# Patient Record
Sex: FEMALE | Race: BLACK OR AFRICAN AMERICAN | NOT HISPANIC OR LATINO
[De-identification: names, ages, dates, MRNs, and addresses within clinical notes are randomized per-mention and may not be internally consistent; named-entity substitution may affect disease eponyms.]

---

## 2018-08-02 PROBLEM — Z00.00 ENCOUNTER FOR PREVENTIVE HEALTH EXAMINATION: Status: ACTIVE | Noted: 2018-08-02

## 2019-09-03 ENCOUNTER — APPOINTMENT (OUTPATIENT)
Dept: ORTHOPEDIC SURGERY | Facility: CLINIC | Age: 79
End: 2019-09-03
Payer: MEDICARE

## 2019-09-03 VITALS
WEIGHT: 196 LBS | HEIGHT: 63 IN | DIASTOLIC BLOOD PRESSURE: 90 MMHG | SYSTOLIC BLOOD PRESSURE: 133 MMHG | BODY MASS INDEX: 34.73 KG/M2 | HEART RATE: 88 BPM

## 2019-09-03 DIAGNOSIS — Z87.39 PERSONAL HISTORY OF OTHER DISEASES OF THE MUSCULOSKELETAL SYSTEM AND CONNECTIVE TISSUE: ICD-10-CM

## 2019-09-03 DIAGNOSIS — Z86.39 PERSONAL HISTORY OF OTHER ENDOCRINE, NUTRITIONAL AND METABOLIC DISEASE: ICD-10-CM

## 2019-09-03 DIAGNOSIS — Z86.79 PERSONAL HISTORY OF OTHER DISEASES OF THE CIRCULATORY SYSTEM: ICD-10-CM

## 2019-09-03 DIAGNOSIS — M16.11 UNILATERAL PRIMARY OSTEOARTHRITIS, RIGHT HIP: ICD-10-CM

## 2019-09-03 DIAGNOSIS — Z82.61 FAMILY HISTORY OF ARTHRITIS: ICD-10-CM

## 2019-09-03 DIAGNOSIS — Z95.0 PRESENCE OF CARDIAC PACEMAKER: ICD-10-CM

## 2019-09-03 PROCEDURE — 99204 OFFICE O/P NEW MOD 45 MIN: CPT

## 2019-09-03 PROCEDURE — 73502 X-RAY EXAM HIP UNI 2-3 VIEWS: CPT

## 2019-09-03 RX ORDER — APIXABAN 5 MG/1
TABLET, FILM COATED ORAL
Refills: 0 | Status: ACTIVE | COMMUNITY

## 2019-09-03 RX ORDER — SOTALOL HYDROCHLORIDE 80 MG/1
80 TABLET ORAL
Refills: 0 | Status: ACTIVE | COMMUNITY

## 2019-09-03 RX ORDER — ALIROCUMAB 75 MG/ML
75 INJECTION, SOLUTION SUBCUTANEOUS
Refills: 0 | Status: ACTIVE | COMMUNITY

## 2019-09-17 ENCOUNTER — OUTPATIENT (OUTPATIENT)
Dept: OUTPATIENT SERVICES | Facility: HOSPITAL | Age: 79
LOS: 1 days | End: 2019-09-17
Payer: MEDICARE

## 2019-09-17 ENCOUNTER — TRANSCRIPTION ENCOUNTER (OUTPATIENT)
Age: 79
End: 2019-09-17

## 2019-09-17 VITALS
TEMPERATURE: 98 F | RESPIRATION RATE: 15 BRPM | SYSTOLIC BLOOD PRESSURE: 118 MMHG | HEART RATE: 93 BPM | DIASTOLIC BLOOD PRESSURE: 82 MMHG | HEIGHT: 63 IN | WEIGHT: 191.8 LBS | OXYGEN SATURATION: 100 %

## 2019-09-17 DIAGNOSIS — M25.551 PAIN IN RIGHT HIP: ICD-10-CM

## 2019-09-17 DIAGNOSIS — Z90.721 ACQUIRED ABSENCE OF OVARIES, UNILATERAL: Chronic | ICD-10-CM

## 2019-09-17 DIAGNOSIS — Z90.710 ACQUIRED ABSENCE OF BOTH CERVIX AND UTERUS: Chronic | ICD-10-CM

## 2019-09-17 DIAGNOSIS — M16.11 UNILATERAL PRIMARY OSTEOARTHRITIS, RIGHT HIP: ICD-10-CM

## 2019-09-17 DIAGNOSIS — Z96.659 PRESENCE OF UNSPECIFIED ARTIFICIAL KNEE JOINT: Chronic | ICD-10-CM

## 2019-09-17 DIAGNOSIS — Z90.89 ACQUIRED ABSENCE OF OTHER ORGANS: Chronic | ICD-10-CM

## 2019-09-17 DIAGNOSIS — Z79.01 LONG TERM (CURRENT) USE OF ANTICOAGULANTS: ICD-10-CM

## 2019-09-17 DIAGNOSIS — Z01.818 ENCOUNTER FOR OTHER PREPROCEDURAL EXAMINATION: ICD-10-CM

## 2019-09-17 DIAGNOSIS — Z95.0 PRESENCE OF CARDIAC PACEMAKER: ICD-10-CM

## 2019-09-17 LAB
ALBUMIN SERPL ELPH-MCNC: 3.7 G/DL — SIGNIFICANT CHANGE UP (ref 3.3–5)
ALP SERPL-CCNC: 104 U/L — SIGNIFICANT CHANGE UP (ref 30–120)
ALT FLD-CCNC: 34 U/L DA — SIGNIFICANT CHANGE UP (ref 10–60)
ANION GAP SERPL CALC-SCNC: 8 MMOL/L — SIGNIFICANT CHANGE UP (ref 5–17)
APTT BLD: 32.6 SEC — SIGNIFICANT CHANGE UP (ref 28.5–37)
AST SERPL-CCNC: 23 U/L — SIGNIFICANT CHANGE UP (ref 10–40)
BILIRUB SERPL-MCNC: 0.5 MG/DL — SIGNIFICANT CHANGE UP (ref 0.2–1.2)
BUN SERPL-MCNC: 28 MG/DL — HIGH (ref 7–23)
CALCIUM SERPL-MCNC: 9.7 MG/DL — SIGNIFICANT CHANGE UP (ref 8.4–10.5)
CHLORIDE SERPL-SCNC: 104 MMOL/L — SIGNIFICANT CHANGE UP (ref 96–108)
CO2 SERPL-SCNC: 29 MMOL/L — SIGNIFICANT CHANGE UP (ref 22–31)
CREAT SERPL-MCNC: 1.21 MG/DL — SIGNIFICANT CHANGE UP (ref 0.5–1.3)
GLUCOSE SERPL-MCNC: 90 MG/DL — SIGNIFICANT CHANGE UP (ref 70–99)
HCT VFR BLD CALC: 38.7 % — SIGNIFICANT CHANGE UP (ref 34.5–45)
HGB BLD-MCNC: 12.8 G/DL — SIGNIFICANT CHANGE UP (ref 11.5–15.5)
INR BLD: 1.46 RATIO — HIGH (ref 0.88–1.16)
MCHC RBC-ENTMCNC: 28.3 PG — SIGNIFICANT CHANGE UP (ref 27–34)
MCHC RBC-ENTMCNC: 33.1 GM/DL — SIGNIFICANT CHANGE UP (ref 32–36)
MCV RBC AUTO: 85.4 FL — SIGNIFICANT CHANGE UP (ref 80–100)
NRBC # BLD: 0 /100 WBCS — SIGNIFICANT CHANGE UP (ref 0–0)
PLATELET # BLD AUTO: 168 K/UL — SIGNIFICANT CHANGE UP (ref 150–400)
POTASSIUM SERPL-MCNC: 4.5 MMOL/L — SIGNIFICANT CHANGE UP (ref 3.5–5.3)
POTASSIUM SERPL-SCNC: 4.5 MMOL/L — SIGNIFICANT CHANGE UP (ref 3.5–5.3)
PROT SERPL-MCNC: 7.6 G/DL — SIGNIFICANT CHANGE UP (ref 6–8.3)
PROTHROM AB SERPL-ACNC: 16.1 SEC — HIGH (ref 10–12.9)
RBC # BLD: 4.53 M/UL — SIGNIFICANT CHANGE UP (ref 3.8–5.2)
RBC # FLD: 13.3 % — SIGNIFICANT CHANGE UP (ref 10.3–14.5)
SODIUM SERPL-SCNC: 141 MMOL/L — SIGNIFICANT CHANGE UP (ref 135–145)
WBC # BLD: 6.03 K/UL — SIGNIFICANT CHANGE UP (ref 3.8–10.5)
WBC # FLD AUTO: 6.03 K/UL — SIGNIFICANT CHANGE UP (ref 3.8–10.5)

## 2019-09-17 PROCEDURE — 93010 ELECTROCARDIOGRAM REPORT: CPT

## 2019-09-17 PROCEDURE — G0463: CPT

## 2019-09-17 PROCEDURE — 93005 ELECTROCARDIOGRAM TRACING: CPT

## 2019-09-17 NOTE — H&P PST ADULT - NSICDXPROBLEM_GEN_ALL_CORE_FT
PROBLEM DIAGNOSES  Problem: Cardiac pacemaker  Assessment and Plan: need interrogation letter from cardio    Problem: On anticoagulant therapy  Assessment and Plan: cardiac clearance to be done; patient to check with cardio re stopping eliquis    Problem: Right hip pain  Assessment and Plan: right total hip replacment

## 2019-09-17 NOTE — H&P PST ADULT - NSICDXFAMILYHX_GEN_ALL_CORE_FT
FAMILY HISTORY:  Family history of glaucoma, father  Family history of heart attack, father  age 78  Family history of multiple sclerosis, son  Family history of osteoarthritis, mother , sister  FHx: hypertension, mother, sister, son

## 2019-09-17 NOTE — H&P PST ADULT - NSICDXPASTMEDICALHX_GEN_ALL_CORE_FT
PAST MEDICAL HISTORY:  Atrial fibrillation     Hearing loss left ear    Hyperlipidemia     Hypertension     Multiple thyroid nodules negative biopsy    Osteoarthritis

## 2019-09-17 NOTE — H&P PST ADULT - NSANTHOSAYNRD_GEN_A_CORE
No. J LUIS screening performed.  STOP BANG Legend: 0-2 = LOW Risk; 3-4 = INTERMEDIATE Risk; 5-8 = HIGH Risk

## 2019-09-17 NOTE — H&P PST ADULT - NSICDXPASTSURGICALHX_GEN_ALL_CORE_FT
PAST SURGICAL HISTORY:  S/P hysterectomy     S/P left oophorectomy     S/P tonsillectomy     S/P total knee arthroplasty right knee

## 2019-09-17 NOTE — H&P PST ADULT - HISTORY OF PRESENT ILLNESS
this is a 79 y/o female who has had right hip pain for about 5 months; tests show arthritis, bone on bone; to have right total hip replacement; takes tylenol for pain

## 2019-09-18 LAB
MRSA PCR RESULT.: SIGNIFICANT CHANGE UP
S AUREUS DNA NOSE QL NAA+PROBE: SIGNIFICANT CHANGE UP

## 2019-09-27 NOTE — PROGRESS NOTE ADULT - SUBJECTIVE AND OBJECTIVE BOX
Presurgical evaluation:    Allergies:  penicillin: Rash  sulfa drugs: Swelling    Home Medications:   · 	Apixaban (Eliquis) 5 mg 2 times a day  · 	sotalol 40mg 2 times a day  · 	losartan 25 mg once a day  · 	Alirocumab (Praluent) 75 mg/mL subcutaneous every other week  · 	Colace 100 mg 3 caps once a day  · 	Vitamin C 500 mg once a day  · 	Vitamin D3 1000 intl units once a day  · 	Tylenol 8 Hour 650 mg-- 2 tabs 2 times a day    PAST MEDICAL HISTORY:  Atrial fibrillation   Hearing loss left ear  Hyperlipidemia   Hypertension   Multiple thyroid nodules negative biopsy  Osteoarthritis    PAST SURGICAL HISTORY:  S/P hysterectomy   S/P left oophorectomy   S/P tonsillectomy   S/P total knee arthroplasty right knee     PST values of interest:  PT/INR 16.1/1.46 (­ Þ Eliquis)  SCr 1.21 mg/dL (WNL)  BUN 28 (­)  QTc 421ms (WNL)  LFTs WNL

## 2019-09-27 NOTE — PROGRESS NOTE ADULT - ASSESSMENT
Presurgical evaluation:  1.	IV TXA  2.	Vancomycin 15mg/kg IV within 2 hours of incision and repeat x1 dose 12 hours later  3.	Acetaminophen and Meloxicam 7.5mg Qday (x2-3 weeks only) for multimodal pain management. Monitor postop renal function for Meloxicam use.  4.	VTE prophylaxis: Eliquis 2.5mg q12h POD1 then Eliquis 5mg q12h beginning POD2 Presurgical evaluation:  1.	IV TXA  2.	Vancomycin 15mg/kg IV within 2 hours of incision and repeat x1 dose 12 hours later  3.	Acetaminophen and Meloxicam 7.5mg Qday (x21 days only) for multimodal pain management/HO prophylaxis. Monitor postop renal function for Meloxicam use.  4.	VTE prophylaxis: Eliquis 2.5mg q12h POD1 then Eliquis 5mg q12h beginning POD2

## 2019-10-01 ENCOUNTER — APPOINTMENT (OUTPATIENT)
Dept: ORTHOPEDIC SURGERY | Facility: CLINIC | Age: 79
End: 2019-10-01
Payer: MEDICARE

## 2019-10-01 PROBLEM — I48.91 UNSPECIFIED ATRIAL FIBRILLATION: Chronic | Status: ACTIVE | Noted: 2019-09-17

## 2019-10-01 PROBLEM — H91.90 UNSPECIFIED HEARING LOSS, UNSPECIFIED EAR: Chronic | Status: ACTIVE | Noted: 2019-09-17

## 2019-10-01 PROBLEM — E04.2 NONTOXIC MULTINODULAR GOITER: Chronic | Status: ACTIVE | Noted: 2019-09-17

## 2019-10-01 PROBLEM — I10 ESSENTIAL (PRIMARY) HYPERTENSION: Chronic | Status: ACTIVE | Noted: 2019-09-17

## 2019-10-01 PROBLEM — M19.90 UNSPECIFIED OSTEOARTHRITIS, UNSPECIFIED SITE: Chronic | Status: ACTIVE | Noted: 2019-09-17

## 2019-10-01 PROBLEM — E78.5 HYPERLIPIDEMIA, UNSPECIFIED: Chronic | Status: ACTIVE | Noted: 2019-09-17

## 2019-10-01 PROCEDURE — 99212 OFFICE O/P EST SF 10 MIN: CPT | Mod: NC

## 2019-10-01 RX ORDER — PANTOPRAZOLE SODIUM 20 MG/1
40 TABLET, DELAYED RELEASE ORAL
Refills: 0 | Status: DISCONTINUED | OUTPATIENT
Start: 2019-10-02 | End: 2019-10-04

## 2019-10-01 RX ORDER — POLYETHYLENE GLYCOL 3350 17 G/17G
17 POWDER, FOR SOLUTION ORAL DAILY
Refills: 0 | Status: DISCONTINUED | OUTPATIENT
Start: 2019-10-02 | End: 2019-10-02

## 2019-10-01 RX ORDER — SENNA PLUS 8.6 MG/1
2 TABLET ORAL AT BEDTIME
Refills: 0 | Status: DISCONTINUED | OUTPATIENT
Start: 2019-10-02 | End: 2019-10-02

## 2019-10-01 RX ORDER — SODIUM CHLORIDE 9 MG/ML
1000 INJECTION, SOLUTION INTRAVENOUS
Refills: 0 | Status: DISCONTINUED | OUTPATIENT
Start: 2019-10-02 | End: 2019-10-04

## 2019-10-01 RX ORDER — DOCUSATE SODIUM 100 MG
100 CAPSULE ORAL THREE TIMES A DAY
Refills: 0 | Status: DISCONTINUED | OUTPATIENT
Start: 2019-10-02 | End: 2019-10-04

## 2019-10-01 RX ORDER — ONDANSETRON 8 MG/1
4 TABLET, FILM COATED ORAL EVERY 6 HOURS
Refills: 0 | Status: DISCONTINUED | OUTPATIENT
Start: 2019-10-02 | End: 2019-10-04

## 2019-10-01 RX ORDER — MAGNESIUM HYDROXIDE 400 MG/1
30 TABLET, CHEWABLE ORAL DAILY
Refills: 0 | Status: DISCONTINUED | OUTPATIENT
Start: 2019-10-02 | End: 2019-10-04

## 2019-10-01 NOTE — PATIENT PROFILE ADULT - BRADEN ACTIVITY
1500 PICC nurses here to place picc line in upper Lt arm. Prior picc line in upper RT arm reddened and removed in office. Reviewed allergies and home medications   (3) walks occasionally

## 2019-10-02 ENCOUNTER — APPOINTMENT (OUTPATIENT)
Dept: ORTHOPEDIC SURGERY | Facility: HOSPITAL | Age: 79
End: 2019-10-02

## 2019-10-02 ENCOUNTER — INPATIENT (INPATIENT)
Facility: HOSPITAL | Age: 79
LOS: 1 days | Discharge: NURSING, REHAB OR LONG-TERM CARE | DRG: 470 | End: 2019-10-04
Attending: ORTHOPAEDIC SURGERY | Admitting: ORTHOPAEDIC SURGERY
Payer: MEDICARE

## 2019-10-02 ENCOUNTER — RESULT REVIEW (OUTPATIENT)
Age: 79
End: 2019-10-02

## 2019-10-02 VITALS
HEART RATE: 66 BPM | RESPIRATION RATE: 21 BRPM | WEIGHT: 192.46 LBS | HEIGHT: 63 IN | OXYGEN SATURATION: 100 % | SYSTOLIC BLOOD PRESSURE: 121 MMHG | TEMPERATURE: 98 F | DIASTOLIC BLOOD PRESSURE: 81 MMHG

## 2019-10-02 DIAGNOSIS — Z90.710 ACQUIRED ABSENCE OF BOTH CERVIX AND UTERUS: Chronic | ICD-10-CM

## 2019-10-02 DIAGNOSIS — Z96.659 PRESENCE OF UNSPECIFIED ARTIFICIAL KNEE JOINT: Chronic | ICD-10-CM

## 2019-10-02 DIAGNOSIS — M16.11 UNILATERAL PRIMARY OSTEOARTHRITIS, RIGHT HIP: ICD-10-CM

## 2019-10-02 DIAGNOSIS — Z90.721 ACQUIRED ABSENCE OF OVARIES, UNILATERAL: Chronic | ICD-10-CM

## 2019-10-02 DIAGNOSIS — Z95.0 PRESENCE OF CARDIAC PACEMAKER: Chronic | ICD-10-CM

## 2019-10-02 DIAGNOSIS — Z90.89 ACQUIRED ABSENCE OF OTHER ORGANS: Chronic | ICD-10-CM

## 2019-10-02 LAB
ANION GAP SERPL CALC-SCNC: 6 MMOL/L — SIGNIFICANT CHANGE UP (ref 5–17)
BUN SERPL-MCNC: 32 MG/DL — HIGH (ref 7–23)
CALCIUM SERPL-MCNC: 9.4 MG/DL — SIGNIFICANT CHANGE UP (ref 8.4–10.5)
CHLORIDE SERPL-SCNC: 105 MMOL/L — SIGNIFICANT CHANGE UP (ref 96–108)
CO2 SERPL-SCNC: 27 MMOL/L — SIGNIFICANT CHANGE UP (ref 22–31)
CREAT SERPL-MCNC: 1.25 MG/DL — SIGNIFICANT CHANGE UP (ref 0.5–1.3)
GLUCOSE SERPL-MCNC: 172 MG/DL — HIGH (ref 70–99)
HCT VFR BLD CALC: 31.2 % — LOW (ref 34.5–45)
HGB BLD-MCNC: 10.3 G/DL — LOW (ref 11.5–15.5)
INR BLD: 1.11 RATIO — SIGNIFICANT CHANGE UP (ref 0.88–1.16)
MCHC RBC-ENTMCNC: 28.5 PG — SIGNIFICANT CHANGE UP (ref 27–34)
MCHC RBC-ENTMCNC: 33 GM/DL — SIGNIFICANT CHANGE UP (ref 32–36)
MCV RBC AUTO: 86.2 FL — SIGNIFICANT CHANGE UP (ref 80–100)
NRBC # BLD: 0 /100 WBCS — SIGNIFICANT CHANGE UP (ref 0–0)
PLATELET # BLD AUTO: 154 K/UL — SIGNIFICANT CHANGE UP (ref 150–400)
POTASSIUM SERPL-MCNC: 5 MMOL/L — SIGNIFICANT CHANGE UP (ref 3.5–5.3)
POTASSIUM SERPL-SCNC: 5 MMOL/L — SIGNIFICANT CHANGE UP (ref 3.5–5.3)
PROTHROM AB SERPL-ACNC: 12.1 SEC — SIGNIFICANT CHANGE UP (ref 10–12.9)
RBC # BLD: 3.62 M/UL — LOW (ref 3.8–5.2)
RBC # FLD: 13.6 % — SIGNIFICANT CHANGE UP (ref 10.3–14.5)
SODIUM SERPL-SCNC: 138 MMOL/L — SIGNIFICANT CHANGE UP (ref 135–145)
WBC # BLD: 10.68 K/UL — HIGH (ref 3.8–10.5)
WBC # FLD AUTO: 10.68 K/UL — HIGH (ref 3.8–10.5)

## 2019-10-02 PROCEDURE — 27130 TOTAL HIP ARTHROPLASTY: CPT | Mod: 82,RT

## 2019-10-02 PROCEDURE — 73502 X-RAY EXAM HIP UNI 2-3 VIEWS: CPT | Mod: 26,RT

## 2019-10-02 PROCEDURE — 27130 TOTAL HIP ARTHROPLASTY: CPT | Mod: RT

## 2019-10-02 PROCEDURE — 88305 TISSUE EXAM BY PATHOLOGIST: CPT | Mod: 26

## 2019-10-02 PROCEDURE — 88311 DECALCIFY TISSUE: CPT | Mod: 26

## 2019-10-02 PROCEDURE — 99223 1ST HOSP IP/OBS HIGH 75: CPT

## 2019-10-02 RX ORDER — MELOXICAM 15 MG/1
7.5 TABLET ORAL DAILY
Refills: 0 | Status: DISCONTINUED | OUTPATIENT
Start: 2019-10-03 | End: 2019-10-04

## 2019-10-02 RX ORDER — SOTALOL HCL 120 MG
40 TABLET ORAL
Qty: 0 | Refills: 0 | DISCHARGE

## 2019-10-02 RX ORDER — CHLORHEXIDINE GLUCONATE 213 G/1000ML
1 SOLUTION TOPICAL ONCE
Refills: 0 | Status: COMPLETED | OUTPATIENT
Start: 2019-10-02 | End: 2019-10-02

## 2019-10-02 RX ORDER — HYDROMORPHONE HYDROCHLORIDE 2 MG/ML
0.5 INJECTION INTRAMUSCULAR; INTRAVENOUS; SUBCUTANEOUS
Refills: 0 | Status: DISCONTINUED | OUTPATIENT
Start: 2019-10-02 | End: 2019-10-04

## 2019-10-02 RX ORDER — ASCORBIC ACID 60 MG
1 TABLET,CHEWABLE ORAL
Qty: 0 | Refills: 0 | DISCHARGE

## 2019-10-02 RX ORDER — VANCOMYCIN HCL 1 G
1250 VIAL (EA) INTRAVENOUS ONCE
Refills: 0 | Status: COMPLETED | OUTPATIENT
Start: 2019-10-02 | End: 2019-10-02

## 2019-10-02 RX ORDER — TRANEXAMIC ACID 100 MG/ML
1000 INJECTION, SOLUTION INTRAVENOUS ONCE
Refills: 0 | Status: COMPLETED | OUTPATIENT
Start: 2019-10-02 | End: 2019-10-02

## 2019-10-02 RX ORDER — ACETAMINOPHEN 500 MG
1000 TABLET ORAL EVERY 6 HOURS
Refills: 0 | Status: COMPLETED | OUTPATIENT
Start: 2019-10-02 | End: 2019-10-03

## 2019-10-02 RX ORDER — DOCUSATE SODIUM 100 MG
3 CAPSULE ORAL
Qty: 0 | Refills: 0 | DISCHARGE

## 2019-10-02 RX ORDER — ALIROCUMAB 75 MG/ML
1 INJECTION, SOLUTION SUBCUTANEOUS
Qty: 0 | Refills: 0 | DISCHARGE

## 2019-10-02 RX ORDER — POLYETHYLENE GLYCOL 3350 17 G/17G
17 POWDER, FOR SOLUTION ORAL DAILY
Refills: 0 | Status: DISCONTINUED | OUTPATIENT
Start: 2019-10-02 | End: 2019-10-04

## 2019-10-02 RX ORDER — HYDROMORPHONE HYDROCHLORIDE 2 MG/ML
0.5 INJECTION INTRAMUSCULAR; INTRAVENOUS; SUBCUTANEOUS
Refills: 0 | Status: DISCONTINUED | OUTPATIENT
Start: 2019-10-02 | End: 2019-10-02

## 2019-10-02 RX ORDER — SENNA PLUS 8.6 MG/1
2 TABLET ORAL AT BEDTIME
Refills: 0 | Status: DISCONTINUED | OUTPATIENT
Start: 2019-10-02 | End: 2019-10-02

## 2019-10-02 RX ORDER — ASPIRIN/CALCIUM CARB/MAGNESIUM 324 MG
81 TABLET ORAL DAILY
Refills: 0 | Status: DISCONTINUED | OUTPATIENT
Start: 2019-10-03 | End: 2019-10-03

## 2019-10-02 RX ORDER — LOSARTAN POTASSIUM 100 MG/1
25 TABLET, FILM COATED ORAL DAILY
Refills: 0 | Status: DISCONTINUED | OUTPATIENT
Start: 2019-10-04 | End: 2019-10-04

## 2019-10-02 RX ORDER — SOTALOL HCL 120 MG
40 TABLET ORAL
Refills: 0 | Status: DISCONTINUED | OUTPATIENT
Start: 2019-10-02 | End: 2019-10-04

## 2019-10-02 RX ORDER — ACETAMINOPHEN 500 MG
1000 TABLET ORAL EVERY 8 HOURS
Refills: 0 | Status: DISCONTINUED | OUTPATIENT
Start: 2019-10-03 | End: 2019-10-04

## 2019-10-02 RX ORDER — APIXABAN 2.5 MG/1
2.5 TABLET, FILM COATED ORAL EVERY 12 HOURS
Refills: 0 | Status: COMPLETED | OUTPATIENT
Start: 2019-10-03 | End: 2019-10-03

## 2019-10-02 RX ORDER — SODIUM CHLORIDE 9 MG/ML
1000 INJECTION, SOLUTION INTRAVENOUS
Refills: 0 | Status: DISCONTINUED | OUTPATIENT
Start: 2019-10-02 | End: 2019-10-02

## 2019-10-02 RX ORDER — OXYCODONE HYDROCHLORIDE 5 MG/1
5 TABLET ORAL
Refills: 0 | Status: DISCONTINUED | OUTPATIENT
Start: 2019-10-02 | End: 2019-10-03

## 2019-10-02 RX ORDER — OXYCODONE HYDROCHLORIDE 5 MG/1
10 TABLET ORAL
Refills: 0 | Status: DISCONTINUED | OUTPATIENT
Start: 2019-10-02 | End: 2019-10-03

## 2019-10-02 RX ORDER — APIXABAN 2.5 MG/1
5 TABLET, FILM COATED ORAL EVERY 12 HOURS
Refills: 0 | Status: DISCONTINUED | OUTPATIENT
Start: 2019-10-04 | End: 2019-10-04

## 2019-10-02 RX ORDER — POLYETHYLENE GLYCOL 3350 17 G/17G
17 POWDER, FOR SOLUTION ORAL DAILY
Refills: 0 | Status: DISCONTINUED | OUTPATIENT
Start: 2019-10-02 | End: 2019-10-02

## 2019-10-02 RX ORDER — ACETAMINOPHEN 500 MG
1000 TABLET ORAL ONCE
Refills: 0 | Status: COMPLETED | OUTPATIENT
Start: 2019-10-02 | End: 2019-10-02

## 2019-10-02 RX ORDER — ONDANSETRON 8 MG/1
4 TABLET, FILM COATED ORAL ONCE
Refills: 0 | Status: DISCONTINUED | OUTPATIENT
Start: 2019-10-02 | End: 2019-10-02

## 2019-10-02 RX ORDER — SENNA PLUS 8.6 MG/1
2 TABLET ORAL AT BEDTIME
Refills: 0 | Status: DISCONTINUED | OUTPATIENT
Start: 2019-10-02 | End: 2019-10-04

## 2019-10-02 RX ORDER — CHOLECALCIFEROL (VITAMIN D3) 125 MCG
1 CAPSULE ORAL
Qty: 0 | Refills: 0 | DISCHARGE

## 2019-10-02 RX ORDER — APREPITANT 80 MG/1
40 CAPSULE ORAL ONCE
Refills: 0 | Status: COMPLETED | OUTPATIENT
Start: 2019-10-02 | End: 2019-10-02

## 2019-10-02 RX ORDER — LOSARTAN POTASSIUM 100 MG/1
1 TABLET, FILM COATED ORAL
Qty: 0 | Refills: 0 | DISCHARGE

## 2019-10-02 RX ADMIN — Medication 100 MILLIGRAM(S): at 21:44

## 2019-10-02 RX ADMIN — OXYCODONE HYDROCHLORIDE 5 MILLIGRAM(S): 5 TABLET ORAL at 15:30

## 2019-10-02 RX ADMIN — Medication 400 MILLIGRAM(S): at 15:36

## 2019-10-02 RX ADMIN — SENNA PLUS 2 TABLET(S): 8.6 TABLET ORAL at 21:44

## 2019-10-02 RX ADMIN — Medication 400 MILLIGRAM(S): at 21:43

## 2019-10-02 RX ADMIN — Medication 166.67 MILLIGRAM(S): at 19:25

## 2019-10-02 RX ADMIN — OXYCODONE HYDROCHLORIDE 5 MILLIGRAM(S): 5 TABLET ORAL at 14:34

## 2019-10-02 RX ADMIN — Medication 40 MILLIGRAM(S): at 17:53

## 2019-10-02 RX ADMIN — Medication 1000 MILLIGRAM(S): at 16:00

## 2019-10-02 RX ADMIN — Medication 1000 MILLIGRAM(S): at 21:44

## 2019-10-02 RX ADMIN — APREPITANT 40 MILLIGRAM(S): 80 CAPSULE ORAL at 06:45

## 2019-10-02 RX ADMIN — Medication 166.67 MILLIGRAM(S): at 06:45

## 2019-10-02 RX ADMIN — SODIUM CHLORIDE 75 MILLILITER(S): 9 INJECTION, SOLUTION INTRAVENOUS at 14:35

## 2019-10-02 RX ADMIN — CHLORHEXIDINE GLUCONATE 1 APPLICATION(S): 213 SOLUTION TOPICAL at 06:45

## 2019-10-02 NOTE — CONSULT NOTE ADULT - SUBJECTIVE AND OBJECTIVE BOX
Patient is a 78y old  Female who presents with a chief complaint of   this is a 79 y/o female who has had right hip pain for about 5 months; tests show arthritis, bone on bone; to have right total hip replacement; takes tylenol for pain  HPI:    Patient is seen and examined.    PAST MEDICAL & SURGICAL HISTORY:  Hearing loss: left ear  Multiple thyroid nodules: negative biopsy  Osteoarthritis  Hyperlipidemia  Hypertension  Atrial fibrillation  Cardiac pacemaker:   S/P total knee arthroplasty: right knee  S/P left oophorectomy  S/P hysterectomy  S/P tonsillectomy      MEDICATIONS  (STANDING):  acetaminophen  IVPB .. 1000 milliGRAM(s) IV Intermittent every 6 hours  lactated ringers. 1000 milliLiter(s) (100 mL/Hr) IV Continuous <Continuous>    MEDICATIONS  (PRN):  HYDROmorphone  Injectable 0.5 milliGRAM(s) IV Push every 10 minutes PRN Moderate Pain (4 - 6)  ondansetron Injectable 4 milliGRAM(s) IV Push once PRN Nausea and/or Vomiting      Allergies    penicillin (Rash)  sulfa drugs (Swelling)    Intolerances    SOCIAL HISTORY:  Smoker:  YES / NO        PACK YEARS:                         WHEN QUIT?  ETOH use:  YES / NO               FREQUENCY / QUANTITY:  Ilicit Drug use:  YES / NO  Occupation:  Assisted device use (Cane / Walker):  Live with:    FAMILY HISTORY:  Family history of multiple sclerosis: son  FHx: hypertension: mother, sister, son  Family history of osteoarthritis: mother , sister  Family history of glaucoma: father  Family history of heart attack: father  age 78      Vital Signs Last 24 Hrs  T(C): 36.4 (02 Oct 2019 10:49), Max: 36.7 (02 Oct 2019 06:16)  T(F): 97.5 (02 Oct 2019 10:49), Max: 98 (02 Oct 2019 06:16)  HR: 60 (02 Oct 2019 11:35) (60 - 66)  BP: 126/71 (02 Oct 2019 11:35) (117/67 - 129/77)  BP(mean): --  RR: 12 (02 Oct 2019 11:35) (10 - 21)  SpO2: 100% (02 Oct 2019 11:04) (100% - 100%)      LABS:          PT/INR - ( 02 Oct 2019 06:47 )   PT: 12.1 sec;   INR: 1.11 ratio Patient is a 78y old  Female who presents with a chief complaint of   this is a 79 y/o female who has had right hip pain for about 5 months; tests show arthritis, bone on bone; to have right total hip replacement; takes tylenol for pain  HPI:    Patient is seen and examined.  Pain is controlled.    PAST MEDICAL & SURGICAL HISTORY:  Hearing loss: left ear  Multiple thyroid nodules: negative biopsy  Osteoarthritis  Hyperlipidemia  Hypertension  Atrial fibrillation  Cardiac pacemaker:   S/P total knee arthroplasty: right knee  S/P left oophorectomy  S/P hysterectomy  S/P tonsillectomy      MEDICATIONS  (STANDING):  acetaminophen  IVPB .. 1000 milliGRAM(s) IV Intermittent every 6 hours  lactated ringers. 1000 milliLiter(s) (100 mL/Hr) IV Continuous <Continuous>    MEDICATIONS  (PRN):  HYDROmorphone  Injectable 0.5 milliGRAM(s) IV Push every 10 minutes PRN Moderate Pain (4 - 6)  ondansetron Injectable 4 milliGRAM(s) IV Push once PRN Nausea and/or Vomiting      Allergies    penicillin (Rash)  sulfa drugs (Swelling)    Intolerances    SOCIAL HISTORY:  Smoker:  NO        PACK YEARS:                         WHEN QUIT?  ETOH use:  YES / social               FREQUENCY / QUANTITY:  Ilicit Drug use:   NO  Occupation:  Assisted device use (Cane / Walker):  Live with:    FAMILY HISTORY:  Family history of multiple sclerosis: son  FHx: hypertension: mother, sister, son  Family history of osteoarthritis: mother , sister  Family history of glaucoma: father  Family history of heart attack: father  age 78      Vital Signs Last 24 Hrs  T(C): 36.4 (02 Oct 2019 10:49), Max: 36.7 (02 Oct 2019 06:16)  T(F): 97.5 (02 Oct 2019 10:49), Max: 98 (02 Oct 2019 06:16)  HR: 60 (02 Oct 2019 11:35) (60 - 66)  BP: 126/71 (02 Oct 2019 11:35) (117/67 - 129/77)  BP(mean): --  RR: 12 (02 Oct 2019 11:35) (10 - 21)  SpO2: 100% (02 Oct 2019 11:04) (100% - 100%)      LABS:    PT/INR - ( 02 Oct 2019 06:47 )   PT: 12.1 sec;   INR: 1.11 ratio

## 2019-10-02 NOTE — PHYSICAL THERAPY INITIAL EVALUATION ADULT - RANGE OF MOTION EXAMINATION, REHAB EVAL
bilateral upper extremity ROM was WFL (within functional limits)/right hip not tested due to surgery, px/Left LE ROM was WFL (within functional limits)

## 2019-10-02 NOTE — OCCUPATIONAL THERAPY INITIAL EVALUATION ADULT - SOCIAL CONCERNS
[FreeTextEntry1] : You have been diagnosed with an allergic condition.  Keep the area clean and dry.  Avoid excessive scratching.\par Use an antihistamine daily.  Take all your medications and prescribed with plenty of water.  Watch for any signs of possible infection including fever, discharge or increasing redness.  If no improvement or any worsening of symptoms go directly to the ER.\par \par shot of solumedrol given\par \par insomnia\par failed ambien\par failed trazodone\par failed xanax .5\par will try restoril None

## 2019-10-02 NOTE — OCCUPATIONAL THERAPY INITIAL EVALUATION ADULT - ANTICIPATED DISCHARGE DISPOSITION, OT EVAL
rehabilitation facility/Pt would benefit from continued OT in inpatient rehabilitation facility to optimize pt's functional and safety. SW made aware.

## 2019-10-02 NOTE — BRIEF OPERATIVE NOTE - NSICDXBRIEFPREOP_GEN_ALL_CORE_FT
PRE-OP DIAGNOSIS:  Primary localized osteoarthritis of right hip 02-Oct-2019 10:48:19  Vinicio Wynn  Primary localized osteoarthritis of right hip 02-Oct-2019 10:47:59  Vinicio Wynn

## 2019-10-02 NOTE — PHYSICAL THERAPY INITIAL EVALUATION ADULT - CRITERIA FOR SKILLED THERAPEUTIC INTERVENTIONS
anticipated equipment needs at discharge/impairments found/anticipated discharge recommendation/[facility]

## 2019-10-02 NOTE — CONSULT NOTE ADULT - ASSESSMENT
Aftercare following Right THR 10/2    pain meds oxycodone and dilaudid.. Monitor for respiratory depression and lethargy.  PT/OT.  DVT prophylaxis.  DVT ppx: [ ]ASA81 [ ] MZM666 [ ] Lovenox [ ] Coumadin   [x ] Eliquis [  ] Heparin  Dispo:     Home [ ]     Acute Rehab [ ]     [facility] [ ]     TBD [x ]    HTN/Dyslipidemia/A fib  losartan, sotalol with parameter.    Plan of care was discuss with patient, allquestions were answered, seems understand and in agreement. Aftercare following Right THR 10/2    pain meds oxycodone and dilaudid.. Monitor for respiratory depression and lethargy.  PT/OT.  DVT prophylaxis.  DVT ppx: [ ]ASA81 [ ] MYP864 [ ] Lovenox [ ] Coumadin   [x ] Eliquis [  ] Heparin  Dispo:     Home [ ]     Acute Rehab [ ]     [facility] [ ]     TBD [x ]    HTN/Dyslipidemia/A fib  losartan, sotalol with parameter.    Plan of care was discuss with patient and family, all questions were answered, seems understand and in agreement.

## 2019-10-02 NOTE — BRIEF OPERATIVE NOTE - NSICDXBRIEFPOSTOP_GEN_ALL_CORE_FT
POST-OP DIAGNOSIS:  Primary localized osteoarthritis of right hip 02-Oct-2019 10:48:48  Vinicio Wynn

## 2019-10-02 NOTE — OCCUPATIONAL THERAPY INITIAL EVALUATION ADULT - ADDITIONAL COMMENTS
Pt lives in a 2 family home 4 [address] +railing and 13 steps +railing +tub with doors +grab bars  PTA pt used a SAC

## 2019-10-03 ENCOUNTER — TRANSCRIPTION ENCOUNTER (OUTPATIENT)
Age: 79
End: 2019-10-03

## 2019-10-03 LAB
ANION GAP SERPL CALC-SCNC: 4 MMOL/L — LOW (ref 5–17)
BUN SERPL-MCNC: 33 MG/DL — HIGH (ref 7–23)
CALCIUM SERPL-MCNC: 9.2 MG/DL — SIGNIFICANT CHANGE UP (ref 8.4–10.5)
CHLORIDE SERPL-SCNC: 108 MMOL/L — SIGNIFICANT CHANGE UP (ref 96–108)
CO2 SERPL-SCNC: 30 MMOL/L — SIGNIFICANT CHANGE UP (ref 22–31)
CREAT SERPL-MCNC: 1.29 MG/DL — SIGNIFICANT CHANGE UP (ref 0.5–1.3)
GLUCOSE SERPL-MCNC: 105 MG/DL — HIGH (ref 70–99)
HCT VFR BLD CALC: 27 % — LOW (ref 34.5–45)
HGB BLD-MCNC: 8.9 G/DL — LOW (ref 11.5–15.5)
MCHC RBC-ENTMCNC: 28.4 PG — SIGNIFICANT CHANGE UP (ref 27–34)
MCHC RBC-ENTMCNC: 33 GM/DL — SIGNIFICANT CHANGE UP (ref 32–36)
MCV RBC AUTO: 86.3 FL — SIGNIFICANT CHANGE UP (ref 80–100)
NRBC # BLD: 0 /100 WBCS — SIGNIFICANT CHANGE UP (ref 0–0)
PLATELET # BLD AUTO: 144 K/UL — LOW (ref 150–400)
POTASSIUM SERPL-MCNC: 5.2 MMOL/L — SIGNIFICANT CHANGE UP (ref 3.5–5.3)
POTASSIUM SERPL-SCNC: 5.2 MMOL/L — SIGNIFICANT CHANGE UP (ref 3.5–5.3)
RBC # BLD: 3.13 M/UL — LOW (ref 3.8–5.2)
RBC # FLD: 13.6 % — SIGNIFICANT CHANGE UP (ref 10.3–14.5)
SODIUM SERPL-SCNC: 142 MMOL/L — SIGNIFICANT CHANGE UP (ref 135–145)
WBC # BLD: 7.74 K/UL — SIGNIFICANT CHANGE UP (ref 3.8–10.5)
WBC # FLD AUTO: 7.74 K/UL — SIGNIFICANT CHANGE UP (ref 3.8–10.5)

## 2019-10-03 PROCEDURE — 99233 SBSQ HOSP IP/OBS HIGH 50: CPT

## 2019-10-03 RX ORDER — HYDROMORPHONE HYDROCHLORIDE 2 MG/ML
4 INJECTION INTRAMUSCULAR; INTRAVENOUS; SUBCUTANEOUS
Refills: 0 | Status: DISCONTINUED | OUTPATIENT
Start: 2019-10-03 | End: 2019-10-04

## 2019-10-03 RX ORDER — HYDROMORPHONE HYDROCHLORIDE 2 MG/ML
2 INJECTION INTRAMUSCULAR; INTRAVENOUS; SUBCUTANEOUS
Refills: 0 | Status: DISCONTINUED | OUTPATIENT
Start: 2019-10-03 | End: 2019-10-04

## 2019-10-03 RX ORDER — PANTOPRAZOLE SODIUM 20 MG/1
1 TABLET, DELAYED RELEASE ORAL
Qty: 30 | Refills: 1
Start: 2019-10-03 | End: 2019-12-01

## 2019-10-03 RX ORDER — MELOXICAM 15 MG/1
1 TABLET ORAL
Qty: 19 | Refills: 0
Start: 2019-10-03 | End: 2019-10-21

## 2019-10-03 RX ADMIN — OXYCODONE HYDROCHLORIDE 5 MILLIGRAM(S): 5 TABLET ORAL at 02:45

## 2019-10-03 RX ADMIN — HYDROMORPHONE HYDROCHLORIDE 2 MILLIGRAM(S): 2 INJECTION INTRAMUSCULAR; INTRAVENOUS; SUBCUTANEOUS at 19:21

## 2019-10-03 RX ADMIN — MELOXICAM 7.5 MILLIGRAM(S): 15 TABLET ORAL at 09:25

## 2019-10-03 RX ADMIN — APIXABAN 2.5 MILLIGRAM(S): 2.5 TABLET, FILM COATED ORAL at 09:25

## 2019-10-03 RX ADMIN — Medication 1000 MILLIGRAM(S): at 17:34

## 2019-10-03 RX ADMIN — PANTOPRAZOLE SODIUM 40 MILLIGRAM(S): 20 TABLET, DELAYED RELEASE ORAL at 06:19

## 2019-10-03 RX ADMIN — Medication 1000 MILLIGRAM(S): at 09:25

## 2019-10-03 RX ADMIN — Medication 1000 MILLIGRAM(S): at 17:32

## 2019-10-03 RX ADMIN — Medication 100 MILLIGRAM(S): at 21:09

## 2019-10-03 RX ADMIN — APIXABAN 2.5 MILLIGRAM(S): 2.5 TABLET, FILM COATED ORAL at 21:10

## 2019-10-03 RX ADMIN — Medication 81 MILLIGRAM(S): at 06:19

## 2019-10-03 RX ADMIN — Medication 1000 MILLIGRAM(S): at 05:02

## 2019-10-03 RX ADMIN — SENNA PLUS 2 TABLET(S): 8.6 TABLET ORAL at 21:09

## 2019-10-03 RX ADMIN — HYDROMORPHONE HYDROCHLORIDE 2 MILLIGRAM(S): 2 INJECTION INTRAMUSCULAR; INTRAVENOUS; SUBCUTANEOUS at 19:55

## 2019-10-03 RX ADMIN — Medication 100 MILLIGRAM(S): at 06:19

## 2019-10-03 RX ADMIN — OXYCODONE HYDROCHLORIDE 5 MILLIGRAM(S): 5 TABLET ORAL at 02:07

## 2019-10-03 RX ADMIN — Medication 400 MILLIGRAM(S): at 04:04

## 2019-10-03 RX ADMIN — Medication 100 MILLIGRAM(S): at 13:43

## 2019-10-03 NOTE — DISCHARGE NOTE PROVIDER - CARE PROVIDER_API CALL
Sma Kendall)  Orthopaedic Surgery  [address], [address]  [place] [address]  Phone: [phone]  Fax: [phone]  Follow Up Time: 2 weeks

## 2019-10-03 NOTE — DISCHARGE NOTE PROVIDER - NSDCFUSCHEDAPPT_GEN_ALL_CORE_FT
JULISSA MONIQUE ; 10/21/2019 ; [facility] [address] [facility]  JULISSA MONIQUE ; 12/03/2019 ; [facility] [address] [place] [address] JULISSA MONIQUE ; 10/21/2019 ; [facility] [address] [facility]  JULISSA MONIQUE ; 12/03/2019 ; [facility] [address] [place] [address]

## 2019-10-03 NOTE — DISCHARGE NOTE PROVIDER - HOSPITAL COURSE
JULISSA MONIQUE        Admitted on 10-02-19         78y y.o.  Female with history of Primary localized osteoarthritis of right hip        Surgery: Total replacement of right hip joint        Orthopedic Surgeon:   Dr. NELSON Kendall        Dorie-operative antibiotic:  vancomycin  IVPB:    vancomycin  IVPB:            Consulted Services : Hospitalist, Physical Therapy, Occupational Therapy        Typical Physical & occupational therapy modalities post     Total replacement of right hip joint     were performed including ambulation training, range of motion, ADL's, and transfers.         DVT prophylaxis:  apixaban: 2.5 milliGRAM(s)    aspirin enteric coated: 81 milliGRAM(s)             The patient had a clean appearing surgical incision with no sign of surgical site infections and had a stable neuro / vascular exam of the operated extremity.  After progression of mobility guided by the PT/ OT staff,  the patient was felt to benefit from further rehabilitative care for restoration to level of function. This was felt to best be accomplished in a Rehab facility.        Discharge and Orthopedic Care instructions were delineated in the Discharge Plan and reviewed with the patient. All medications were delineated in the medication reconciliation tool and key points were reviewed with the patient.         This patient was deemed stable from an Orthopedic & medical standpoint for discharge today.    She will follow up with Dr. NELSON Kendall for further Orthopedic care. JULISSA MONIQUE        Admitted on 10-02-19         78y y.o.  Female with history of Primary localized osteoarthritis of right hip        Surgery: Total replacement of right hip joint        Orthopedic Surgeon:   Dr. NELSON Kendall        Dorie-operative antibiotic:  vancomycin  IVPB:    vancomycin  IVPB:            Consulted Services : Hospitalist, Physical Therapy, Occupational Therapy        Typical Physical & occupational therapy modalities post     Total replacement of right hip joint     were performed including ambulation training, range of motion, ADL's, and transfers.         DVT prophylaxis:  apixaban: 2.5 milliGRAM(s)    aspirin enteric coated: 81 milliGRAM(s)             The patient has SILVANA dressing on which will stay on for total of 7 days, to be removed in rehab on 10/9 and had a stable neuro / vascular exam of the operated extremity.  After progression of mobility guided by the PT/ OT staff,  the patient was felt to benefit from further rehabilitative care for restoration to level of function. This was felt to best be accomplished in a Rehab facility.        Discharge and Orthopedic Care instructions were delineated in the Discharge Plan and reviewed with the patient. All medications were delineated in the medication reconciliation tool and key points were reviewed with the patient.         This patient was deemed stable from an Orthopedic & medical standpoint for discharge today.    She will follow up with Dr. NELSON Kendall for further Orthopedic care.

## 2019-10-03 NOTE — DISCHARGE NOTE PROVIDER - NSDCACTIVITY_GEN_ALL_CORE
Stairs allowed/Walking - Indoors allowed/No heavy lifting/straining/Walking - Outdoors allowed/Do not drive or operate machinery No heavy lifting/straining/Do not make important decisions/Walking - Outdoors allowed/Stairs allowed/Walking - Indoors allowed/Do not drive or operate machinery

## 2019-10-03 NOTE — PROGRESS NOTE ADULT - SUBJECTIVE AND OBJECTIVE BOX
Post Op Day #1    SUBJECTIVE    79yo Female status post Right THR .   Patient is alert and comfortable.    Pain is controlled with current pain regimen.  Denies nausea, vomiting, chest pain, shortness of breath, abdominal pain or fever.   No new complaints.    OBJECTIVE    Vital Signs Last 24 Hrs  T(C): 36.4 (03 Oct 2019 07:53), Max: 36.4 (02 Oct 2019 10:49)  T(F): 97.5 (03 Oct 2019 07:53), Max: 97.6 (02 Oct 2019 20:37)  HR: 60 (03 Oct 2019 07:53) (59 - 70)  BP: 93/60 (03 Oct 2019 07:53) (93/60 - 140/84)  BP(mean): --  RR: 18 (03 Oct 2019 07:53) (10 - 18)  SpO2: 97% (03 Oct 2019 07:53) (90% - 100%)  I&O's Summary    02 Oct 2019 07:01  -  03 Oct 2019 07:00  --------------------------------------------------------  IN: 2025 mL / OUT: 1000 mL / NET: 1025 mL        PHYSICAL EXAM    Right Hip SILVANA dressing  is clean, dry and intact.   The calf is supple/nontender.   Sensation to light touch is grossly intact distally.   Motor function distally is intact.   No foot drop.   (2+) dorsalis pedis pulse. Capillary refill is less than 2 seconds. No cyanosis.                          8.9<L>  7.74  )-----------( 144<L>    ( 03 Oct 2019 07:09 )             27.0<L>  03 Oct 2019 07:09                        10.3<L>  10.68<H> )-----------( 154      ( 02 Oct 2019 21:31 )             31.2<L>  02 Oct 2019 21:31    03 Oct 2019 07:09    142    |  108    |  33<H>  ----------------------------<  105<H>  5.2     |  30     |  1.29   02 Oct 2019 21:31    138    |  105    |  32<H>  ----------------------------<  172<H>  5.0     |  27     |  1.25     Ca    9.2        03 Oct 2019 07:09  Ca    9.4        02 Oct 2019 21:31        ASSESSMENT AND PLAN    - Orthopedically stable  - DVT prophylaxis: PAS + Eliquis  -  HO prophylaxis: Meloxicam 7.5mg PO twice daily x21 days  - Continue physical therapy and occupational therapy  - Weight bearing as tolerated of the right lower extremity with assistance of a walker  - Incentive spirometry encouraged  - Pain control as clinically indicated  - Disposition:  subacute rehabilitation Post Op Day #1    SUBJECTIVE    79yo Female status post Right THR .   Patient is alert and comfortable.    Pain is not well controlled with current pain regimen. Patient states that oxycodone does not work for her  Denies nausea, vomiting, chest pain, shortness of breath, abdominal pain or fever.   No new complaints.    OBJECTIVE    Vital Signs Last 24 Hrs  T(C): 36.4 (03 Oct 2019 07:53), Max: 36.4 (02 Oct 2019 10:49)  T(F): 97.5 (03 Oct 2019 07:53), Max: 97.6 (02 Oct 2019 20:37)  HR: 60 (03 Oct 2019 07:53) (59 - 70)  BP: 93/60 (03 Oct 2019 07:53) (93/60 - 140/84)  BP(mean): --  RR: 18 (03 Oct 2019 07:53) (10 - 18)  SpO2: 97% (03 Oct 2019 07:53) (90% - 100%)  I&O's Summary    02 Oct 2019 07:01  -  03 Oct 2019 07:00  --------------------------------------------------------  IN: 2025 mL / OUT: 1000 mL / NET: 1025 mL        PHYSICAL EXAM    Right Hip SILVANA dressing  is clean, dry and intact.   The calf is supple/nontender.   Sensation to light touch is grossly intact distally.   Motor function distally is intact.   No foot drop.   (2+) dorsalis pedis pulse. Capillary refill is less than 2 seconds. No cyanosis.                          8.9<L>  7.74  )-----------( 144<L>    ( 03 Oct 2019 07:09 )             27.0<L>  03 Oct 2019 07:09                        10.3<L>  10.68<H> )-----------( 154      ( 02 Oct 2019 21:31 )             31.2<L>  02 Oct 2019 21:31    03 Oct 2019 07:09    142    |  108    |  33<H>  ----------------------------<  105<H>  5.2     |  30     |  1.29   02 Oct 2019 21:31    138    |  105    |  32<H>  ----------------------------<  172<H>  5.0     |  27     |  1.25     Ca    9.2        03 Oct 2019 07:09  Ca    9.4        02 Oct 2019 21:31        ASSESSMENT AND PLAN    - Orthopedically stable  - DVT prophylaxis: PAS + Eliquis  -  HO prophylaxis: Meloxicam 7.5mg PO twice daily x21 days  - Continue physical therapy and occupational therapy  - Weight bearing as tolerated of the right lower extremity with assistance of a walker  - Incentive spirometry encouraged  - Pain control as clinically indicated: Will switch to Dilaudid 2mg or 4mg PRN  - Disposition:  subacute rehabilitation

## 2019-10-03 NOTE — PROGRESS NOTE ADULT - SUBJECTIVE AND OBJECTIVE BOX
Patient is a 78y old  Female who presents with a chief complaint of     INTERVAL HPI/OVERNIGHT EVENTS:  Pt is seen and examined.  Pain is controlled.    Pain Location & Control:     MEDICATIONS  (STANDING):  acetaminophen   Tablet .. 1000 milliGRAM(s) Oral every 8 hours  apixaban 2.5 milliGRAM(s) Oral every 12 hours  aspirin enteric coated 81 milliGRAM(s) Oral daily  docusate sodium 100 milliGRAM(s) Oral three times a day  lactated ringers. 1000 milliLiter(s) (75 mL/Hr) IV Continuous <Continuous>  meloxicam 7.5 milliGRAM(s) Oral daily  pantoprazole    Tablet 40 milliGRAM(s) Oral before breakfast  senna 2 Tablet(s) Oral at bedtime  sotalol 40 milliGRAM(s) Oral two times a day    MEDICATIONS  (PRN):  aluminum hydroxide/magnesium hydroxide/simethicone Suspension 30 milliLiter(s) Oral four times a day PRN Indigestion  HYDROmorphone   Tablet 2 milliGRAM(s) Oral every 3 hours PRN Mild Pain (1 - 3)  HYDROmorphone   Tablet 4 milliGRAM(s) Oral every 3 hours PRN Moderate Pain (4 - 6)  HYDROmorphone  Injectable 0.5 milliGRAM(s) IV Push every 3 hours PRN Severe Pain (7 - 10)  magnesium hydroxide Suspension 30 milliLiter(s) Oral daily PRN Constipation  ondansetron Injectable 4 milliGRAM(s) IV Push every 6 hours PRN Nausea and/or Vomiting  polyethylene glycol 3350 17 Gram(s) Oral daily PRN Constipation      Allergies    penicillin (Rash)  sulfa drugs (Swelling)    Intolerances    Vital Signs Last 24 Hrs  T(C): 36.4 (03 Oct 2019 07:53), Max: 36.4 (02 Oct 2019 10:49)  T(F): 97.5 (03 Oct 2019 07:53), Max: 97.6 (02 Oct 2019 20:37)  HR: 60 (03 Oct 2019 09:23) (59 - 70)  BP: 94/57 (03 Oct 2019 09:23) (93/60 - 140/84)  BP(mean): --  RR: 18 (03 Oct 2019 07:53) (10 - 18)  SpO2: 97% (03 Oct 2019 07:53) (90% - 100%)        LABS:                        8.9    7.74  )-----------( 144      ( 03 Oct 2019 07:09 )             27.0     03 Oct 2019 07:09    142    |  108    |  33     ----------------------------<  105    5.2     |  30     |  1.29     Ca    9.2        03 Oct 2019 07:09      PT/INR - ( 02 Oct 2019 06:47 )   PT: 12.1 sec;   INR: 1.11 ratio             CAPILLARY BLOOD GLUCOSE            Cultures          RADIOLOGY & ADDITIONAL TESTS:    Imaging Personally Reviewed:  [ ] YES  [ ] NO    Consultant(s) Notes Reviewed:  [ ] YES  [ ] NO    Care Discussed with Consultants/Other Providers [x ] YES  [ ] NO

## 2019-10-03 NOTE — PHARMACOTHERAPY INTERVENTION NOTE - COMMENTS
Cardiologist put patient on ASA 81mg Qday as a "bridge" while Eliquis was interrupted. May DC tomorrow once Eliquis 5mg Q12h resumes.
Transition of Care education at bedside - medication calendar given to patient. Education performed on 10/4/19
Admission medication reconciliation POD1
Presurgical evaluation for postoperative medication management. Team emailed. Note placed in [place].

## 2019-10-03 NOTE — PROGRESS NOTE ADULT - ASSESSMENT
Aftercare following Right THR 10/2    pain meds oxycodone and dilaudid.. Monitor for respiratory depression and lethargy.  PT/OT.  DVT prophylaxis.  DVT ppx: [ ]ASA81 [ ] CDO928 [ ] Lovenox [ ] Coumadin   [x ] Eliquis [  ] Heparin  Dispo:     Home [ ]     Acute Rehab [ ]     [facility] [ ]     TBD [x ]    HTN/Dyslipidemia/A fib  losartan, sotalol with parameter.    Post op anemia due to acute blood loss  asymptomatics.  trend cbc.    Plan of care was discuss with patient , all questions were answered, seems understand and in agreement.

## 2019-10-03 NOTE — DISCHARGE NOTE PROVIDER - NSDCCPCAREPLAN_GEN_ALL_CORE_FT
PRINCIPAL DISCHARGE DIAGNOSIS  Diagnosis: Primary localized osteoarthritis of right hip  Assessment and Plan of Treatment: Physical Therapy /Occupational Therapy for: Ambulation, Transfers , Stairs, Range of motion, isometrics ADLs (activities of daily living)  TOTAL HIP PRECAUTIONS-Weight bearing as tolerated.  Continue abduction pillow while in bed, do not bend hip >90 degrees, sit in hip chair only.  Keep incision area clean and dry you may shower post operative day 5 if no drainage present.  Follow up in 2 weeks for suture/prineo removal.  Follow up with your primary care phsyician within 2-3 weeks of discharge home PRINCIPAL DISCHARGE DIAGNOSIS  Diagnosis: Primary localized osteoarthritis of right hip  Assessment and Plan of Treatment: Physical Therapy /Occupational Therapy for: Ambulation, Transfers , Stairs, Range of motion, isometrics ADLs (activities of daily living)  TOTAL HIP PRECAUTIONS-Weight bearing as tolerated.  Continue abduction pillow while in bed, do not bend hip >90 degrees, sit in hip chair only.  The patient has SILVANA dressing on which will stay on for total of 7 days, to be removed in rehab on 10/9    Follow up in 2 weeks for suture/prineo removal.  Follow up with your primary care phsyician within 2-3 weeks of discharge home

## 2019-10-03 NOTE — PROGRESS NOTE ADULT - SUBJECTIVE AND OBJECTIVE BOX
Discharge medication calendar:  (Eliquis 5mg BID preop)  Eliquis 5mg BID  APAP 1000mg q12h x 2-3 weeks  Meloxicam 7.5mg Qday x 21 days only  Pantoprazole 40mg QAM x 6 weeks  Narcotic PRN  Docusate 100mg TID while taking narcotic  Miralax, Senna, or Bisacodyl PRN for treatment of constipation

## 2019-10-04 ENCOUNTER — TRANSCRIPTION ENCOUNTER (OUTPATIENT)
Age: 79
End: 2019-10-04

## 2019-10-04 VITALS
SYSTOLIC BLOOD PRESSURE: 111 MMHG | DIASTOLIC BLOOD PRESSURE: 74 MMHG | HEART RATE: 62 BPM | RESPIRATION RATE: 16 BRPM | OXYGEN SATURATION: 99 % | TEMPERATURE: 98 F

## 2019-10-04 LAB
ANION GAP SERPL CALC-SCNC: 5 MMOL/L — SIGNIFICANT CHANGE UP (ref 5–17)
BUN SERPL-MCNC: 24 MG/DL — HIGH (ref 7–23)
CALCIUM SERPL-MCNC: 9.3 MG/DL — SIGNIFICANT CHANGE UP (ref 8.4–10.5)
CHLORIDE SERPL-SCNC: 106 MMOL/L — SIGNIFICANT CHANGE UP (ref 96–108)
CO2 SERPL-SCNC: 29 MMOL/L — SIGNIFICANT CHANGE UP (ref 22–31)
CREAT SERPL-MCNC: 1 MG/DL — SIGNIFICANT CHANGE UP (ref 0.5–1.3)
GLUCOSE SERPL-MCNC: 95 MG/DL — SIGNIFICANT CHANGE UP (ref 70–99)
HCT VFR BLD CALC: 27 % — LOW (ref 34.5–45)
HGB BLD-MCNC: 9.1 G/DL — LOW (ref 11.5–15.5)
MCHC RBC-ENTMCNC: 28.8 PG — SIGNIFICANT CHANGE UP (ref 27–34)
MCHC RBC-ENTMCNC: 33.7 GM/DL — SIGNIFICANT CHANGE UP (ref 32–36)
MCV RBC AUTO: 85.4 FL — SIGNIFICANT CHANGE UP (ref 80–100)
NRBC # BLD: 0 /100 WBCS — SIGNIFICANT CHANGE UP (ref 0–0)
PLATELET # BLD AUTO: 116 K/UL — LOW (ref 150–400)
POTASSIUM SERPL-MCNC: 4.6 MMOL/L — SIGNIFICANT CHANGE UP (ref 3.5–5.3)
POTASSIUM SERPL-SCNC: 4.6 MMOL/L — SIGNIFICANT CHANGE UP (ref 3.5–5.3)
RBC # BLD: 3.16 M/UL — LOW (ref 3.8–5.2)
RBC # FLD: 13.8 % — SIGNIFICANT CHANGE UP (ref 10.3–14.5)
SODIUM SERPL-SCNC: 140 MMOL/L — SIGNIFICANT CHANGE UP (ref 135–145)
WBC # BLD: 8.45 K/UL — SIGNIFICANT CHANGE UP (ref 3.8–10.5)
WBC # FLD AUTO: 8.45 K/UL — SIGNIFICANT CHANGE UP (ref 3.8–10.5)

## 2019-10-04 PROCEDURE — 88305 TISSUE EXAM BY PATHOLOGIST: CPT

## 2019-10-04 PROCEDURE — 97165 OT EVAL LOW COMPLEX 30 MIN: CPT

## 2019-10-04 PROCEDURE — 88311 DECALCIFY TISSUE: CPT

## 2019-10-04 PROCEDURE — 85610 PROTHROMBIN TIME: CPT

## 2019-10-04 PROCEDURE — 97535 SELF CARE MNGMENT TRAINING: CPT

## 2019-10-04 PROCEDURE — 85027 COMPLETE CBC AUTOMATED: CPT

## 2019-10-04 PROCEDURE — C1776: CPT

## 2019-10-04 PROCEDURE — 97116 GAIT TRAINING THERAPY: CPT

## 2019-10-04 PROCEDURE — 73502 X-RAY EXAM HIP UNI 2-3 VIEWS: CPT

## 2019-10-04 PROCEDURE — 97110 THERAPEUTIC EXERCISES: CPT

## 2019-10-04 PROCEDURE — 97161 PT EVAL LOW COMPLEX 20 MIN: CPT

## 2019-10-04 PROCEDURE — 97530 THERAPEUTIC ACTIVITIES: CPT

## 2019-10-04 PROCEDURE — C1713: CPT

## 2019-10-04 PROCEDURE — 99233 SBSQ HOSP IP/OBS HIGH 50: CPT

## 2019-10-04 PROCEDURE — 80048 BASIC METABOLIC PNL TOTAL CA: CPT

## 2019-10-04 PROCEDURE — 36415 COLL VENOUS BLD VENIPUNCTURE: CPT

## 2019-10-04 RX ORDER — SIMETHICONE 80 MG/1
80 TABLET, CHEWABLE ORAL EVERY 6 HOURS
Refills: 0 | Status: DISCONTINUED | OUTPATIENT
Start: 2019-10-04 | End: 2019-10-04

## 2019-10-04 RX ORDER — HYDROMORPHONE HYDROCHLORIDE 2 MG/ML
1 INJECTION INTRAMUSCULAR; INTRAVENOUS; SUBCUTANEOUS
Qty: 0 | Refills: 0 | DISCHARGE
Start: 2019-10-04

## 2019-10-04 RX ORDER — SIMETHICONE 80 MG/1
1 TABLET, CHEWABLE ORAL
Qty: 0 | Refills: 0 | DISCHARGE
Start: 2019-10-04

## 2019-10-04 RX ORDER — ASPIRIN/CALCIUM CARB/MAGNESIUM 324 MG
1 TABLET ORAL
Qty: 0 | Refills: 0 | DISCHARGE

## 2019-10-04 RX ORDER — APIXABAN 2.5 MG/1
1 TABLET, FILM COATED ORAL
Qty: 0 | Refills: 0 | DISCHARGE

## 2019-10-04 RX ORDER — ACETAMINOPHEN 500 MG
2 TABLET ORAL
Qty: 0 | Refills: 0 | DISCHARGE
Start: 2019-10-04

## 2019-10-04 RX ORDER — ACETAMINOPHEN 500 MG
2 TABLET ORAL
Qty: 0 | Refills: 0 | DISCHARGE

## 2019-10-04 RX ORDER — APIXABAN 2.5 MG/1
1 TABLET, FILM COATED ORAL
Qty: 0 | Refills: 0 | DISCHARGE
Start: 2019-10-04

## 2019-10-04 RX ADMIN — MELOXICAM 7.5 MILLIGRAM(S): 15 TABLET ORAL at 12:59

## 2019-10-04 RX ADMIN — Medication 1000 MILLIGRAM(S): at 12:59

## 2019-10-04 RX ADMIN — APIXABAN 5 MILLIGRAM(S): 2.5 TABLET, FILM COATED ORAL at 12:28

## 2019-10-04 RX ADMIN — Medication 1000 MILLIGRAM(S): at 12:29

## 2019-10-04 RX ADMIN — Medication 30 MILLILITER(S): at 12:44

## 2019-10-04 RX ADMIN — Medication 100 MILLIGRAM(S): at 05:16

## 2019-10-04 RX ADMIN — MELOXICAM 7.5 MILLIGRAM(S): 15 TABLET ORAL at 12:29

## 2019-10-04 RX ADMIN — SIMETHICONE 80 MILLIGRAM(S): 80 TABLET, CHEWABLE ORAL at 13:43

## 2019-10-04 RX ADMIN — PANTOPRAZOLE SODIUM 40 MILLIGRAM(S): 20 TABLET, DELAYED RELEASE ORAL at 05:16

## 2019-10-04 NOTE — PROGRESS NOTE ADULT - SUBJECTIVE AND OBJECTIVE BOX
Patient is a 78y old  Female who presents with a chief complaint of Osteoarthritis of right hip (03 Oct 2019 13:32)      INTERVAL HPI/OVERNIGHT EVENTS:  Pt is seen and examined.  Pain is controlled. sitting on a chair.    Pain Location & Control:     MEDICATIONS  (STANDING):  acetaminophen   Tablet .. 1000 milliGRAM(s) Oral every 8 hours  apixaban 5 milliGRAM(s) Oral every 12 hours  docusate sodium 100 milliGRAM(s) Oral three times a day  losartan 25 milliGRAM(s) Oral daily  meloxicam 7.5 milliGRAM(s) Oral daily  pantoprazole    Tablet 40 milliGRAM(s) Oral before breakfast  sotalol 40 milliGRAM(s) Oral two times a day    MEDICATIONS  (PRN):  aluminum hydroxide/magnesium hydroxide/simethicone Suspension 30 milliLiter(s) Oral four times a day PRN Indigestion  bisacodyl Suppository 10 milliGRAM(s) Rectal daily PRN Constipation  HYDROmorphone   Tablet 2 milliGRAM(s) Oral every 3 hours PRN Mild Pain (1 - 3)  HYDROmorphone   Tablet 4 milliGRAM(s) Oral every 3 hours PRN Moderate Pain (4 - 6)  HYDROmorphone  Injectable 0.5 milliGRAM(s) IV Push every 3 hours PRN Severe Pain (7 - 10)  magnesium hydroxide Suspension 30 milliLiter(s) Oral daily PRN Constipation  ondansetron Injectable 4 milliGRAM(s) IV Push every 6 hours PRN Nausea and/or Vomiting  polyethylene glycol 3350 17 Gram(s) Oral daily PRN Constipation      Allergies    penicillin (Rash)  sulfa drugs (Swelling)    Intolerances      Vital Signs Last 24 Hrs  T(C): 36.6 (04 Oct 2019 08:02), Max: 36.9 (03 Oct 2019 23:20)  T(F): 97.9 (04 Oct 2019 08:02), Max: 98.4 (03 Oct 2019 23:20)  HR: 62 (04 Oct 2019 08:02) (58 - 78)  BP: 96/58 (04 Oct 2019 08:02) (95/62 - 106/70)  BP(mean): --  RR: 18 (04 Oct 2019 08:02) (18 - 18)  SpO2: 98% (04 Oct 2019 08:02) (93% - 99%)        LABS:                        9.1    8.45  )-----------( 116      ( 04 Oct 2019 06:25 )             27.0     04 Oct 2019 06:25    140    |  106    |  24     ----------------------------<  95     4.6     |  29     |  1.00     Ca    9.3        04 Oct 2019 06:25            RADIOLOGY & ADDITIONAL TESTS:    Imaging Personally Reviewed:  [ ] YES  [ ] NO    Consultant(s) Notes Reviewed:  [ ] YES  [ ] NO    Care Discussed with Consultants/Other Providers [x ] YES  [ ] NO

## 2019-10-04 NOTE — PROGRESS NOTE ADULT - SUBJECTIVE AND OBJECTIVE BOX
Orthopedic P.A.- POD# 2 - s/p Right THR    Patient alert and comfortable in bed with oral meds for pain control.  Denies hip pain or nausea.    Vital Signs Last 24 Hrs  T(C): 36.6 (04 Oct 2019 08:02), Max: 36.9 (03 Oct 2019 23:20)  T(F): 97.9 (04 Oct 2019 08:02), Max: 98.4 (03 Oct 2019 23:20)  HR: 62 (04 Oct 2019 08:02) (57 - 78)  BP: 96/58 (04 Oct 2019 08:02) (94/57 - 106/70)  BP(mean): --  RR: 18 (04 Oct 2019 08:02) (18 - 18)  SpO2: 98% (04 Oct 2019 08:02) (93% - 99%)         I&O's Detail             Labs:                          9.1<L>  8.45  )-----------( 116<L>    ( 04 Oct 2019 06:25 )             27.0<L>  04 Oct 2019 06:25                 04 Oct 2019 06:25    140    |  106    |  24<H>  ----------------------------<  95     4.6     |  29     |  1.00     Ca    9.3        04 Oct 2019 06:25                    MEDICATIONS:acetaminophen   Tablet .. 1000 milliGRAM(s) Oral every 8 hours  aluminum hydroxide/magnesium hydroxide/simethicone Suspension 30 milliLiter(s) Oral four times a day PRN  apixaban 5 milliGRAM(s) Oral every 12 hours  bisacodyl Suppository 10 milliGRAM(s) Rectal daily PRN  docusate sodium 100 milliGRAM(s) Oral three times a day  HYDROmorphone   Tablet 2 milliGRAM(s) Oral every 3 hours PRN  HYDROmorphone   Tablet 4 milliGRAM(s) Oral every 3 hours PRN  HYDROmorphone  Injectable 0.5 milliGRAM(s) IV Push every 3 hours PRN  lactated ringers. 1000 milliLiter(s) IV Continuous <Continuous>  losartan 25 milliGRAM(s) Oral daily  magnesium hydroxide Suspension 30 milliLiter(s) Oral daily PRN  meloxicam 7.5 milliGRAM(s) Oral daily  ondansetron Injectable 4 milliGRAM(s) IV Push every 6 hours PRN  pantoprazole    Tablet 40 milliGRAM(s) Oral before breakfast  polyethylene glycol 3350 17 Gram(s) Oral daily PRN  sotalol 40 milliGRAM(s) Oral two times a day    Anticoagulation:  apixaban 5 milliGRAM(s) Oral every 12 hours      Pain medications:   acetaminophen   Tablet .. 1000 milliGRAM(s) Oral every 8 hours  HYDROmorphone   Tablet 2 milliGRAM(s) Oral every 3 hours PRN  HYDROmorphone   Tablet 4 milliGRAM(s) Oral every 3 hours PRN  HYDROmorphone  Injectable 0.5 milliGRAM(s) IV Push every 3 hours PRN  meloxicam 7.5 milliGRAM(s) Oral daily  ondansetron Injectable 4 milliGRAM(s) IV Push every 6 hours PRN                                       Physical Exam:  Right hip- Abduction pillow in place.  Primary surgical bandage/SILVANA intact and with small amount dried blood noted.  Neurovascular grossly intact LE's.  EHL/ant.tibs 5/5.  PAS on LE's.  Calves soft and non-tender.                                                                                                                                                        A/P:  Orthopedically stable.  -Continue pain management with above plan.  -DVT prophylaxis and AFib management with Eliquis daily long term.  -Labs OK today (asymptomatic acute blood loss anemia)  -PT/OT to increase ambulation and review posterior dislocation precautions  -Dr. Herzog for continued medical care and clearance for discharge to Rehab today.  -Further plan as per attendings.

## 2019-10-04 NOTE — PROGRESS NOTE ADULT - CARDIOVASCULAR
Regular rate & rhythm, normal S1, S2; no murmurs, gallops or rubs; no S3, S4
Regular rate & rhythm, normal S1, S2; no murmurs, gallops or rubs; no S3, S4
not applicable

## 2019-10-04 NOTE — PROGRESS NOTE ADULT - ASSESSMENT
Aftercare following Right THR 10/2    pain meds oxycodone and dilaudid.. Monitor for respiratory depression and lethargy.  PT/OT.  DVT prophylaxis.  DVT ppx: [ ][id number] [ ] [id number] [ ] Lovenox [ ] Coumadin   [x ] Eliquis [  ] Heparin  Dispo:     Home [ ]     Acute Rehab [ ]     DEEPIKA [ ]     TBD [x ]    HTN/Dyslipidemia/A fib  losartan, sotalol with parameter.    Post op anemia due to acute blood loss  asymptomatics.  trend cbc.    Plan of care was discuss with patient , all questions were answered, seems understand and in agreement.

## 2019-10-04 NOTE — PROGRESS NOTE ADULT - SUBJECTIVE AND OBJECTIVE BOX
Patient c/o nausea and gas.  She states it started yesterday.  She felt a little better with ambulation.  Simethicone was ordered and discharge delayed to 4:30pm.  She will be reevaluated prior to discharge.

## 2019-10-04 NOTE — DISCHARGE NOTE NURSING/CASE MANAGEMENT/SOCIAL WORK - PATIENT PORTAL LINK FT
You can access the FollowMyHealth Patient Portal offered by [facility] by registering at the following website: [email or web address]. By joining [employer]’s FollowMyHealth portal, you will also be able to view your health information using other applications (apps) compatible with our system.

## 2019-10-21 ENCOUNTER — APPOINTMENT (OUTPATIENT)
Dept: ORTHOPEDIC SURGERY | Facility: CLINIC | Age: 79
End: 2019-10-21
Payer: MEDICARE

## 2019-10-21 VITALS — DIASTOLIC BLOOD PRESSURE: 84 MMHG | HEART RATE: 77 BPM | SYSTOLIC BLOOD PRESSURE: 120 MMHG

## 2019-10-21 PROCEDURE — 73502 X-RAY EXAM HIP UNI 2-3 VIEWS: CPT

## 2019-10-21 PROCEDURE — 99024 POSTOP FOLLOW-UP VISIT: CPT

## 2019-10-21 RX ORDER — CLINDAMYCIN HYDROCHLORIDE 300 MG/1
300 CAPSULE ORAL
Qty: 20 | Refills: 2 | Status: ACTIVE | COMMUNITY
Start: 2019-10-21 | End: 1900-01-01

## 2019-10-23 ENCOUNTER — CHART COPY (OUTPATIENT)
Age: 79
End: 2019-10-23

## 2019-10-23 ENCOUNTER — APPOINTMENT (OUTPATIENT)
Dept: ORTHOPEDIC SURGERY | Facility: CLINIC | Age: 79
End: 2019-10-23
Payer: MEDICARE

## 2019-10-23 VITALS — SYSTOLIC BLOOD PRESSURE: 123 MMHG | DIASTOLIC BLOOD PRESSURE: 83 MMHG | HEART RATE: 90 BPM | HEIGHT: 63 IN

## 2019-10-23 PROCEDURE — 99024 POSTOP FOLLOW-UP VISIT: CPT

## 2019-10-23 PROCEDURE — ZZZZZ: CPT

## 2019-10-23 NOTE — HISTORY OF PRESENT ILLNESS
[___ Weeks Post Op] : [unfilled] weeks post op [Doing Well] : is doing well [Excellent Pain Control] : has excellent pain control [No Sign of Infection] : is showing no signs of infection [de-identified] : Right total hip replacement 10/2/19 [de-identified] : Well healed surgical scar with 3 sutures emerging from 3 different areas of the incision- at the proximal, middle and distal portions of the incision line. The incision site is otherwise dry and intact without any drainage, erythema, swelling or warmth.  [de-identified] : Patient is a 79 year old female s/p right THR on 10/2/2019. Patient is presenting today for removal of sutures that emerged a few days ago. Patient denies any drainage, redness or warmth to the incision site.  [de-identified] : The 3 sutures were removed easily without pulling. She was instructed to call the office if she has any further questions/ concerns. She will f/u in the office with Dr. Kendall in another 5 weeks.

## 2019-12-03 ENCOUNTER — APPOINTMENT (OUTPATIENT)
Dept: ORTHOPEDIC SURGERY | Facility: CLINIC | Age: 79
End: 2019-12-03
Payer: MEDICARE

## 2019-12-03 VITALS — HEART RATE: 85 BPM | SYSTOLIC BLOOD PRESSURE: 107 MMHG | HEIGHT: 63 IN | DIASTOLIC BLOOD PRESSURE: 78 MMHG

## 2019-12-03 PROCEDURE — 73502 X-RAY EXAM HIP UNI 2-3 VIEWS: CPT | Mod: RT

## 2019-12-03 PROCEDURE — 99024 POSTOP FOLLOW-UP VISIT: CPT

## 2020-01-23 ENCOUNTER — TRANSCRIPTION ENCOUNTER (OUTPATIENT)
Age: 80
End: 2020-01-23

## 2020-12-08 ENCOUNTER — APPOINTMENT (OUTPATIENT)
Dept: ORTHOPEDIC SURGERY | Facility: CLINIC | Age: 80
End: 2020-12-08
Payer: MEDICARE

## 2020-12-08 VITALS
SYSTOLIC BLOOD PRESSURE: 135 MMHG | TEMPERATURE: 96.3 F | HEIGHT: 63 IN | HEART RATE: 85 BPM | DIASTOLIC BLOOD PRESSURE: 91 MMHG

## 2020-12-08 DIAGNOSIS — Z96.641 PRESENCE OF RIGHT ARTIFICIAL HIP JOINT: ICD-10-CM

## 2020-12-08 DIAGNOSIS — M25.551 PAIN IN RIGHT HIP: ICD-10-CM

## 2020-12-08 PROCEDURE — 73502 X-RAY EXAM HIP UNI 2-3 VIEWS: CPT | Mod: RT

## 2020-12-08 PROCEDURE — 99214 OFFICE O/P EST MOD 30 MIN: CPT

## 2020-12-14 ENCOUNTER — APPOINTMENT (OUTPATIENT)
Dept: ORTHOPEDIC SURGERY | Facility: CLINIC | Age: 80
End: 2020-12-14
Payer: MEDICARE

## 2020-12-14 VITALS
DIASTOLIC BLOOD PRESSURE: 80 MMHG | TEMPERATURE: 97.3 F | HEART RATE: 77 BPM | BODY MASS INDEX: 34.73 KG/M2 | SYSTOLIC BLOOD PRESSURE: 123 MMHG | HEIGHT: 63 IN | WEIGHT: 196 LBS

## 2020-12-14 DIAGNOSIS — M43.10 SPONDYLOLISTHESIS, SITE UNSPECIFIED: ICD-10-CM

## 2020-12-14 DIAGNOSIS — M54.5 LOW BACK PAIN: ICD-10-CM

## 2020-12-14 PROCEDURE — 72110 X-RAY EXAM L-2 SPINE 4/>VWS: CPT

## 2020-12-14 PROCEDURE — 99214 OFFICE O/P EST MOD 30 MIN: CPT

## 2020-12-14 RX ORDER — TIZANIDINE 2 MG/1
2 TABLET ORAL
Qty: 30 | Refills: 0 | Status: ACTIVE | COMMUNITY
Start: 2020-12-14 | End: 1900-01-01

## 2021-02-23 NOTE — H&P PST ADULT - GENITOURINARY
Attempted to contact the patient x3 for post-hospital call without answer. Will close encounter at this time.    Felecia Roger CMA, [facility]  Post Hospital Discharge Team       details…

## 2024-09-28 ENCOUNTER — NON-APPOINTMENT (OUTPATIENT)
Age: 84
End: 2024-09-28

## 2024-12-10 NOTE — H&P PST ADULT - REASON FOR ADMISSION
Continue Regimen: indomethicin prn flares Initiate Treatment: Clobetasol ointment bid Render In Strict Bullet Format?: No Detail Level: Zone right hip pain